# Patient Record
Sex: FEMALE | Race: WHITE | NOT HISPANIC OR LATINO | Employment: FULL TIME | URBAN - METROPOLITAN AREA
[De-identification: names, ages, dates, MRNs, and addresses within clinical notes are randomized per-mention and may not be internally consistent; named-entity substitution may affect disease eponyms.]

---

## 2020-03-10 ENCOUNTER — OFFICE VISIT (OUTPATIENT)
Dept: URGENT CARE | Facility: PHYSICIAN GROUP | Age: 30
End: 2020-03-10
Payer: COMMERCIAL

## 2020-03-10 VITALS
TEMPERATURE: 98.4 F | WEIGHT: 178 LBS | HEIGHT: 60 IN | RESPIRATION RATE: 12 BRPM | HEART RATE: 90 BPM | OXYGEN SATURATION: 98 % | BODY MASS INDEX: 34.95 KG/M2 | SYSTOLIC BLOOD PRESSURE: 98 MMHG | DIASTOLIC BLOOD PRESSURE: 60 MMHG

## 2020-03-10 DIAGNOSIS — S05.02XA ABRASION OF LEFT CORNEA, INITIAL ENCOUNTER: ICD-10-CM

## 2020-03-10 PROCEDURE — 99204 OFFICE O/P NEW MOD 45 MIN: CPT | Performed by: PHYSICIAN ASSISTANT

## 2020-03-10 RX ORDER — POLYMYXIN B SULFATE AND TRIMETHOPRIM 1; 10000 MG/ML; [USP'U]/ML
1 SOLUTION OPHTHALMIC 4 TIMES DAILY
Qty: 10 ML | Refills: 0 | Status: SHIPPED | OUTPATIENT
Start: 2020-03-10 | End: 2020-03-15

## 2020-03-10 ASSESSMENT — ENCOUNTER SYMPTOMS
PHOTOPHOBIA: 1
FOREIGN BODY SENSATION: 1
EYE DISCHARGE: 0
FEVER: 0
VOMITING: 0
DOUBLE VISION: 0
BLURRED VISION: 0
SHORTNESS OF BREATH: 0
NAUSEA: 0
SORE THROAT: 0
HEADACHES: 0
EYE REDNESS: 0
COUGH: 0
EYE PAIN: 1
EYE ITCHING: 1

## 2020-03-10 ASSESSMENT — VISUAL ACUITY: OU: 1

## 2020-03-10 NOTE — PROGRESS NOTES
Subjective:      Asiya Ruelas is a 30 y.o. female who presents with Eye Problem (L eye discomfort, feels like something is in L eye, upper eye lid, painful to look up, onset today at 7am )        Eye Problem    The left eye is affected. This is a new problem. The current episode started today. The problem occurs constantly. There was no injury mechanism. She does not wear contacts. Associated symptoms include a foreign body sensation, itching (slight) and photophobia. Pertinent negatives include no blurred vision, eye discharge, double vision, eye redness, fever, nausea or vomiting. She has tried eye drops for the symptoms. The treatment provided mild relief.     The patient states her discomfort and foreign body sensation is located to the upper portion of her left eye.    PMH:  has no past medical history on file.  MEDS: No current outpatient medications on file.  ALLERGIES: No Known Allergies  SURGHX: History reviewed. No pertinent surgical history.  SOCHX:  reports that she has never smoked. She has never used smokeless tobacco. She reports previous alcohol use.  FH: Family history was reviewed, no pertinent findings to report    Review of Systems   Constitutional: Negative for fever.   HENT: Negative for congestion, ear pain and sore throat.    Eyes: Positive for photophobia, pain and itching (slight). Negative for blurred vision, double vision, discharge and redness.   Respiratory: Negative for cough and shortness of breath.    Cardiovascular: Negative for chest pain and leg swelling.   Gastrointestinal: Negative for nausea and vomiting.   Musculoskeletal: Negative for joint pain.   Skin: Negative for rash.   Neurological: Negative for headaches.   All other systems reviewed and are negative.         Objective:     BP (!) 98/60 (BP Location: Right arm, Patient Position: Sitting, BP Cuff Size: Adult)   Pulse 90   Temp 36.9 °C (98.4 °F) (Temporal)   Resp 12   Ht 1.524 m (5')   Wt 80.7 kg (178 lb)   SpO2  98%   BMI 34.76 kg/m²      Physical Exam  Constitutional:       General: She is not in acute distress.     Appearance: Normal appearance. She is well-developed. She is not ill-appearing.   HENT:      Head: Normocephalic and atraumatic.      Right Ear: External ear normal.      Left Ear: External ear normal.      Nose: Nose normal.   Eyes:      General: Lids are normal. Lids are everted, no foreign bodies appreciated. Vision grossly intact.         Left eye: No foreign body.      Extraocular Movements: Extraocular movements intact.      Conjunctiva/sclera: Conjunctivae normal.      Left eye: Left conjunctiva is not injected.      Pupils: Pupils are equal, round, and reactive to light.        Comments:   Left Eye:   Examined the patient's left eye with fluorescein staining.  The patient reports immediate improvement of her foreign body sensation with the administration of the proparacaine.  A small amount of fluorescein uptake was present to the top of the left iris near the junction of the iris and the conjunctiva.  The patient's left conjunctiva is clear without injection.  No discharge was noted.  No foreign body was appreciated.   Neck:      Musculoskeletal: Normal range of motion and neck supple.   Cardiovascular:      Rate and Rhythm: Normal rate.   Pulmonary:      Effort: Pulmonary effort is normal.   Musculoskeletal: Normal range of motion.   Skin:     General: Skin is warm and dry.   Neurological:      Mental Status: She is alert and oriented to person, place, and time.                 Assessment/Plan:     1. Abrasion of left cornea, initial encounter  - polymixin-trimethoprim (POLYTRIM) 44944-3.1 UNIT/ML-% Solution; Place 1 Drop in left eye 4 times a day for 5 days.  Dispense: 10 mL; Refill: 0    Differential diagnoses, supportive care, and indications for immediate follow-up discussed with patient.   Instructed to return to clinic or nearest emergency department for any change in condition, further  concerns, or worsening of symptoms.    OTC Tylenol or Motrin for fever/discomfort.  Avoid touching eyes  Hand Hygiene   Follow-up with PCP and/or Optometrist   AVS printed  Return to clinic or go to the ED if symptoms worsen or fail to improve, or if patient should develop worsening/increasing/persistent foreign body sensation, eye redness, eye drainage, eye pain, eye itchiness, vision changes, periorbital redness or swelling, headache, fever/chills, and/or any concerning symptoms.    Discussed plan with the patient, and she agrees to the above.     Please note that this dictation was created using voice recognition software. I have made every reasonable attempt to correct obvious errors, but I expect that there may be errors of grammar and possibly content that I did not discover before finalizing the note.